# Patient Record
Sex: FEMALE | Race: WHITE | Employment: FULL TIME | URBAN - METROPOLITAN AREA
[De-identification: names, ages, dates, MRNs, and addresses within clinical notes are randomized per-mention and may not be internally consistent; named-entity substitution may affect disease eponyms.]

---

## 2017-01-26 RX ORDER — ACEBUTOLOL HYDROCHLORIDE 200 MG/1
CAPSULE ORAL
Qty: 60 CAPSULE | Refills: 5 | Status: SHIPPED | OUTPATIENT
Start: 2017-01-26 | End: 2017-08-17

## 2017-05-10 RX ORDER — ACEBUTOLOL HYDROCHLORIDE 200 MG/1
CAPSULE ORAL
Qty: 60 CAPSULE | Refills: 5 | Status: SHIPPED | OUTPATIENT
Start: 2017-05-10 | End: 2018-12-06

## 2017-05-26 ENCOUNTER — TELEPHONE (OUTPATIENT)
Dept: INTERNAL MEDICINE CLINIC | Facility: CLINIC | Age: 42
End: 2017-05-26

## 2017-05-26 RX ORDER — ALPRAZOLAM 0.25 MG/1
0.25 TABLET ORAL DAILY
Qty: 10 TABLET | Refills: 0 | OUTPATIENT
Start: 2017-05-26 | End: 2017-06-24

## 2017-05-26 NOTE — TELEPHONE ENCOUNTER
Pt states that she is under extreme stress and is having anxiety attacks. Pt states that this is her third one this week. Transferred call to [facility], [place], operated by [facility].

## 2017-05-26 NOTE — TELEPHONE ENCOUNTER
Patient called and informed. She stated the Xanax works better than the Clonazepam.   Will schedule an appointment if needed. Xanax called into the [facility] in [place]. Dr. Samira Kennedy please sign the order and close the encounter. Thanks.

## 2017-05-26 NOTE — TELEPHONE ENCOUNTER
Xanax– 0.25 mg–1 tablet p.o. daily as needed–10 tablets. Please have patient set up an appointment if multiple episodes or call counseling–behavioral health.

## 2017-05-26 NOTE — TELEPHONE ENCOUNTER
Actions Requested: advice/ expedited appt w/ PCP as no access. Refuses ED or other provider   Situation/Background   Problem: frequent panic attack, father's cancer is worsening and not receiving treatment,  Crying while sitting in car.   Daughter starting breathing (e.g., struggling for each breath, speaks in single words)  * Bluish lips, tongue, or face now  * Difficult to awaken or acting confused  (e.g., disoriented, slurred speech)  * Hysterical or combative behavior  * Sounds like a life-threatening em

## 2017-06-24 RX ORDER — ALPRAZOLAM 0.25 MG/1
TABLET ORAL
Qty: 10 TABLET | Refills: 0 | Status: SHIPPED | OUTPATIENT
Start: 2017-06-24 | End: 2017-08-17

## 2017-06-24 RX ORDER — CLONAZEPAM 0.5 MG/1
TABLET ORAL
Qty: 30 TABLET | Refills: 0 | Status: SHIPPED | OUTPATIENT
Start: 2017-06-24 | End: 2017-08-17

## 2017-06-24 RX ORDER — ALPRAZOLAM 0.25 MG/1
0.25 TABLET ORAL DAILY
Qty: 10 TABLET | Refills: 0 | OUTPATIENT
Start: 2017-06-24 | End: 2017-09-08

## 2017-06-24 RX ORDER — CLONAZEPAM 0.5 MG/1
TABLET ORAL
Qty: 30 TABLET | Refills: 3 | OUTPATIENT
Start: 2017-06-24 | End: 2018-01-19

## 2017-06-24 NOTE — TELEPHONE ENCOUNTER
Patient called in to request status of her RX nothing in epic  Patient states she is out of medication and the RX was sent in 3 days ago by the pharmacy     Clonazepam .5mg   Alprazolam 0.25 mg     if she can have 10 of Alprazolam   Patient states she is g

## 2017-07-12 RX ORDER — ATORVASTATIN CALCIUM 10 MG/1
TABLET, FILM COATED ORAL
Qty: 90 TABLET | Refills: 1 | Status: SHIPPED | OUTPATIENT
Start: 2017-07-12 | End: 2017-08-17

## 2017-08-17 ENCOUNTER — OFFICE VISIT (OUTPATIENT)
Dept: INTERNAL MEDICINE CLINIC | Facility: CLINIC | Age: 42
End: 2017-08-17

## 2017-08-17 VITALS
TEMPERATURE: 98 F | BODY MASS INDEX: 29.06 KG/M2 | HEART RATE: 94 BPM | HEIGHT: 63 IN | DIASTOLIC BLOOD PRESSURE: 79 MMHG | RESPIRATION RATE: 20 BRPM | WEIGHT: 164 LBS | OXYGEN SATURATION: 97 % | SYSTOLIC BLOOD PRESSURE: 116 MMHG

## 2017-08-17 DIAGNOSIS — F17.200 TOBACCO USE DISORDER: ICD-10-CM

## 2017-08-17 DIAGNOSIS — Z72.0 TOBACCO ABUSE DISORDER: ICD-10-CM

## 2017-08-17 DIAGNOSIS — E78.2 MIXED HYPERLIPIDEMIA: ICD-10-CM

## 2017-08-17 DIAGNOSIS — I47.1 SVT (SUPRAVENTRICULAR TACHYCARDIA) (HCC): ICD-10-CM

## 2017-08-17 DIAGNOSIS — Z01.419 ENCOUNTER FOR GYNECOLOGICAL EXAMINATION WITHOUT ABNORMAL FINDING: ICD-10-CM

## 2017-08-17 DIAGNOSIS — N92.6 IRREGULAR PERIODS/MENSTRUAL CYCLES: ICD-10-CM

## 2017-08-17 DIAGNOSIS — F32.A DEPRESSIVE DISORDER: ICD-10-CM

## 2017-08-17 DIAGNOSIS — Z00.00 ROUTINE GENERAL MEDICAL EXAMINATION AT A HEALTH CARE FACILITY: Primary | ICD-10-CM

## 2017-08-17 DIAGNOSIS — Z87.891 PERSONAL HISTORY OF TOBACCO USE, PRESENTING HAZARDS TO HEALTH: ICD-10-CM

## 2017-08-17 PROCEDURE — 99212 OFFICE O/P EST SF 10 MIN: CPT | Performed by: INTERNAL MEDICINE

## 2017-08-17 PROCEDURE — 99213 OFFICE O/P EST LOW 20 MIN: CPT | Performed by: INTERNAL MEDICINE

## 2017-08-17 PROCEDURE — 99396 PREV VISIT EST AGE 40-64: CPT | Performed by: INTERNAL MEDICINE

## 2017-08-17 RX ORDER — IBUPROFEN 800 MG/1
TABLET ORAL
Refills: 0 | COMMUNITY
Start: 2017-07-19 | End: 2019-10-22 | Stop reason: ALTCHOICE

## 2017-08-17 RX ORDER — BUPROPION HYDROCHLORIDE 150 MG/1
150 TABLET ORAL DAILY
Qty: 30 TABLET | Refills: 6 | Status: SHIPPED | OUTPATIENT
Start: 2017-08-17 | End: 2018-08-23

## 2017-08-17 RX ORDER — OMEGA-3-ACID ETHYL ESTERS 1 G/1
1 CAPSULE, LIQUID FILLED ORAL DAILY
COMMUNITY

## 2017-08-17 RX ORDER — ESCITALOPRAM OXALATE 5 MG/1
5 TABLET ORAL DAILY
Qty: 30 TABLET | Refills: 6 | Status: SHIPPED | OUTPATIENT
Start: 2017-08-17 | End: 2018-06-30

## 2017-08-17 NOTE — ASSESSMENT & PLAN NOTE
Depression has has been gradually worse. Multiple stressors at this time. Has discontinued her Lexapro as she did not need it at that time and had developed some sexual dysfunction too.   Advised to restart on the Lexapro–low doses at 5 mg 1 tablet once d

## 2017-08-17 NOTE — ASSESSMENT & PLAN NOTE
Discussed quitting smoking. Patient is under significant amount of stress and hence will start on treatment for depression and then start on Wellbutrin. Reassess in about 3 months.

## 2017-08-17 NOTE — ASSESSMENT & PLAN NOTE
Patient has been on Lipitor 10 mg 1 tablet once daily. She has tolerated it well and her recheck labs completed recently looked great. Triglycerides continue to be elevated however LDL cholesterol looks great.   Advised to cut back on sugars starches alco

## 2017-08-17 NOTE — ASSESSMENT & PLAN NOTE
Patient with multiple episodes of palpitations–2 over the past month. She has been taking acebutolol once daily which could explain the symptoms.   However she did take an over-the-counter energy booster which she has discontinued since and did experience

## 2017-08-17 NOTE — PROGRESS NOTES
HPI:    Patient ID: Reva Murphy is a 38year old female. Anxiety   This is a chronic problem. The current episode started more than 1 year ago. The problem occurs constantly.  The problem has been waxing and waning (has been on clonazepam with some i HENT: Negative. Eyes: Negative. Respiratory: Negative. Cardiovascular: Negative. Gastrointestinal: Negative. Endocrine: Negative. Genitourinary: Negative. Musculoskeletal: Negative. Skin: Negative.     Allergic/Immunologic: Jerri Wills discharge. Neck: Normal range of motion. Neck supple. No JVD present. No thyromegaly present. Cardiovascular: Normal rate, regular rhythm, normal heart sounds and intact distal pulses. No murmur heard.   Pulmonary/Chest: Effort normal and breath soun Coordination normal.   Skin: Skin is warm and dry. No rash noted. No erythema. Psychiatric: She has a normal mood and affect.  Her behavior is normal. Judgment and thought content normal. Cognition and memory are normal.   Nursing note and vitals reviewed completed–no palpable abnormalities, discharge from the nipples or axillary adenopathy. No cervical or inguinal lymphadenopathy. Hernial orifices intact. Pelvic exam completed–no cervical movement tenderness, adnexal palpable abnormalities.   No cystocel patient for more than 3 minutes and options for quitting.

## 2017-08-17 NOTE — PATIENT INSTRUCTIONS
Problem List Items Addressed This Visit        Unprioritized    Depressive disorder     Depression has has been gradually worse. Multiple stressors at this time.   Has discontinued her Lexapro as she did not need it at that time and had developed some sexu SVT (supraventricular tachycardia) ([facility] [address]     Patient with multiple episodes of palpitations–2 over the past month. She has been taking acebutolol once daily which could explain the symptoms.   However she did take an over-the-counter energy booster · Throw away all smoking materials, including matches, lighters, and ashtrays. · Review your list of triggers and your plan for coping with them. · Stay away from people or settings you link with smoking.   · Make a survival kit that includes gum, mints,

## 2017-08-17 NOTE — ASSESSMENT & PLAN NOTE
Normal exam.  Labs as ordered. Skin check -multiple nevi–advised to follow-up with Dr. Brenda Winn for a skin [id number] for an appointment  Breast exam completed–no palpable abnormalities, discharge from the nipples or axillary adenopathy.   No cervical

## 2017-08-19 LAB — HPV I/H RISK 1 DNA SPEC QL NAA+PROBE: NEGATIVE

## 2017-09-09 RX ORDER — ALPRAZOLAM 0.25 MG/1
TABLET ORAL
Qty: 30 TABLET | Refills: 2 | OUTPATIENT
Start: 2017-09-09 | End: 2018-03-15

## 2017-09-19 ENCOUNTER — HOSPITAL ENCOUNTER (OUTPATIENT)
Dept: ULTRASOUND IMAGING | Facility: HOSPITAL | Age: 42
Discharge: HOME | End: 2017-09-19
Attending: INTERNAL MEDICINE
Payer: COMMERCIAL

## 2017-09-19 DIAGNOSIS — N92.6 IRREGULAR PERIODS/MENSTRUAL CYCLES: ICD-10-CM

## 2017-09-19 PROCEDURE — 76856 US EXAM PELVIC COMPLETE: CPT | Performed by: INTERNAL MEDICINE

## 2017-09-19 PROCEDURE — 76830 TRANSVAGINAL US NON-OB: CPT | Performed by: INTERNAL MEDICINE

## 2017-10-19 ENCOUNTER — OFFICE VISIT (OUTPATIENT)
Dept: DERMATOLOGY CLINIC | Facility: CLINIC | Age: 42
End: 2017-10-19

## 2017-10-19 VITALS — BODY MASS INDEX: 29 KG/M2 | WEIGHT: 162 LBS

## 2017-10-19 DIAGNOSIS — D22.9 MULTIPLE NEVI: ICD-10-CM

## 2017-10-19 DIAGNOSIS — D23.70 BENIGN NEOPLASM OF SKIN OF LOWER LIMB, INCLUDING HIP, UNSPECIFIED LATERALITY: ICD-10-CM

## 2017-10-19 DIAGNOSIS — D23.5 BENIGN NEOPLASM OF SKIN OF TRUNK, EXCEPT SCROTUM: ICD-10-CM

## 2017-10-19 DIAGNOSIS — D23.30 BENIGN NEOPLASM OF SKIN OF FACE: ICD-10-CM

## 2017-10-19 DIAGNOSIS — L30.9 DERMATITIS: Primary | ICD-10-CM

## 2017-10-19 DIAGNOSIS — D23.60 BENIGN NEOPLASM OF SKIN OF UPPER LIMB, INCLUDING SHOULDER, UNSPECIFIED LATERALITY: ICD-10-CM

## 2017-10-19 DIAGNOSIS — D23.4 BENIGN NEOPLASM OF SCALP AND SKIN OF NECK: ICD-10-CM

## 2017-10-19 PROCEDURE — 99202 OFFICE O/P NEW SF 15 MIN: CPT | Performed by: DERMATOLOGY

## 2017-10-19 PROCEDURE — 99212 OFFICE O/P EST SF 10 MIN: CPT | Performed by: DERMATOLOGY

## 2017-10-19 RX ORDER — CLOBETASOL PROPIONATE 0.5 MG/G
1 CREAM TOPICAL 2 TIMES DAILY
Qty: 45 G | Refills: 1 | Status: SHIPPED | OUTPATIENT
Start: 2017-10-19 | End: 2018-10-19

## 2017-10-19 RX ORDER — AMMONIUM LACTATE 12 G/100G
LOTION TOPICAL
Qty: 500 G | Refills: 3 | Status: SHIPPED | OUTPATIENT
Start: 2017-10-19

## 2017-10-29 NOTE — PROGRESS NOTES
Ariel Andino is a 38year old female.   HPI:     CC:  Patient presents with:  Derm Problem: scaly round patches on truck ,back,and rt leg ,denies itch ,bleeding or pain x 2 weeks   Moles: lower extrematies patient concerned ,hx of tanning ,denies itch ,p ammonium lactate (LAC-HYDRIN) 12 % External Lotion Use bid as directed to legs Disp: 500 g Rfl: 3   ALPRAZOLAM 0.25 MG Oral Tab TAKE 1 TABLET BY MOUTH DAILY AS NEEDED Disp: 30 tablet Rfl: 2   COENZYME Q-10 OR Take by mouth.  Disp:  Rfl:    Omega-3-acid Et History  None on file     Social History Main Topics   Smoking status: Current Every Day Smoker  1.00 Packs/day  For 18.00 Years     Types: Cigarettes    Smokeless tobacco: Never Used    Alcohol use Yes  0.0 oz/week     Comment: 1 drink every 2-3 months alert oriented in no acute distress. Exam total-body performed, including scalp, head, neck, face,nails, hair, external eyes, including conjunctival mucosa, eyelids, lips external ears, back, chest,/ breasts, axillae,  abdomen, arms, legs, palms.      Mult scaling seborrheic keratoses, DS AP-like a over the legs a nd some areas at AmLactin for xerotic, scaling changes. Numerous benign-appearing nevi no suspicious lesions reassurance. Please refer to map for specific lesions. See additional diagnoses.   P

## 2017-11-02 RX ORDER — ACEBUTOLOL HYDROCHLORIDE 200 MG/1
CAPSULE ORAL
Qty: 180 CAPSULE | Refills: 1 | Status: SHIPPED | OUTPATIENT
Start: 2017-11-02 | End: 2019-10-17

## 2018-01-20 RX ORDER — CLONAZEPAM 0.5 MG/1
TABLET ORAL
Qty: 30 TABLET | Refills: 3 | OUTPATIENT
Start: 2018-01-20 | End: 2018-08-23

## 2018-01-22 RX ORDER — ERGOCALCIFEROL 1.25 MG/1
CAPSULE ORAL
Qty: 12 CAPSULE | Refills: 0 | OUTPATIENT
Start: 2018-01-22

## 2018-01-26 ENCOUNTER — TELEPHONE (OUTPATIENT)
Dept: INTERNAL MEDICINE CLINIC | Facility: CLINIC | Age: 43
End: 2018-01-26

## 2018-01-26 NOTE — TELEPHONE ENCOUNTER
Pharmacy requesting refill for Current Outpatient Prescriptions:  CLONAZEPAM 0.5 MG Oral Tab TAKE 1 TABLET BY MOUTH AS NEEDED FOR ANXIETY Disp: 30 tablet Rfl: 3   Sent over refill 2 times.

## 2018-03-16 RX ORDER — ALPRAZOLAM 0.25 MG/1
TABLET ORAL
Qty: 30 TABLET | Refills: 1 | OUTPATIENT
Start: 2018-03-16 | End: 2019-03-16

## 2018-05-10 RX ORDER — ATORVASTATIN CALCIUM 10 MG/1
TABLET, FILM COATED ORAL
Qty: 90 TABLET | Refills: 1 | Status: SHIPPED | OUTPATIENT
Start: 2018-05-10 | End: 2018-12-06

## 2018-05-10 RX ORDER — ACEBUTOLOL HYDROCHLORIDE 200 MG/1
CAPSULE ORAL
Qty: 180 CAPSULE | Refills: 1 | Status: SHIPPED | OUTPATIENT
Start: 2018-05-10 | End: 2018-12-06

## 2018-07-01 NOTE — TELEPHONE ENCOUNTER
Refill Protocol Appointment Criteria  · Appointment scheduled in the past 6 months or in the next 3 months  Recent Outpatient Visits            8 months ago Dermatitis    [facility] Dermat[facility] Ramiro Ovalle MD    Office Visit    10 months

## 2018-07-02 RX ORDER — ESCITALOPRAM OXALATE 5 MG/1
TABLET ORAL
Qty: 30 TABLET | Refills: 5 | Status: SHIPPED | OUTPATIENT
Start: 2018-07-02 | End: 2018-12-06

## 2018-08-25 NOTE — TELEPHONE ENCOUNTER
LOV: 8-17-17 Last Rx: 8-17-17    Please advise in regards to refill request. Thank You    Refill Protocol Appointment Criteria  · Appointment scheduled in the past 6 months or in the next 3 months  Recent Outpatient Visits            10 months ago Dermatit

## 2018-08-27 RX ORDER — BUPROPION HYDROCHLORIDE 150 MG/1
TABLET ORAL
Qty: 30 TABLET | Refills: 5 | Status: SHIPPED | OUTPATIENT
Start: 2018-08-27 | End: 2019-10-22

## 2018-08-27 RX ORDER — CLONAZEPAM 0.5 MG/1
TABLET ORAL
Qty: 30 TABLET | Refills: 5 | Status: SHIPPED
Start: 2018-08-27 | End: 2019-04-06

## 2018-08-28 ENCOUNTER — TELEPHONE (OUTPATIENT)
Dept: INTERNAL MEDICINE CLINIC | Facility: CLINIC | Age: 43
End: 2018-08-28

## 2018-08-28 NOTE — TELEPHONE ENCOUNTER
[address], [phone], [phone]   Medication Detail    Disp Refills Start End    BUPROPION HCL ER, XL, 150 MG Oral Tablet 24 Hr 30 tablet 5 8/27/2018     Sig:

## 2018-08-28 NOTE — TELEPHONE ENCOUNTER
Pharmacy is requesting a refill for BUPROPION XL 150MG TABLETS (24 H) for a quantity of 30. Please advise.

## 2018-12-06 ENCOUNTER — TELEPHONE (OUTPATIENT)
Dept: INTERNAL MEDICINE CLINIC | Facility: CLINIC | Age: 43
End: 2018-12-06

## 2018-12-06 ENCOUNTER — OFFICE VISIT (OUTPATIENT)
Dept: INTERNAL MEDICINE CLINIC | Facility: CLINIC | Age: 43
End: 2018-12-06
Payer: COMMERCIAL

## 2018-12-06 VITALS
WEIGHT: 164.5 LBS | OXYGEN SATURATION: 97 % | SYSTOLIC BLOOD PRESSURE: 134 MMHG | HEIGHT: 62.75 IN | BODY MASS INDEX: 29.52 KG/M2 | TEMPERATURE: 98 F | DIASTOLIC BLOOD PRESSURE: 86 MMHG | RESPIRATION RATE: 22 BRPM | HEART RATE: 116 BPM

## 2018-12-06 DIAGNOSIS — I47.1 SVT (SUPRAVENTRICULAR TACHYCARDIA) (HCC): ICD-10-CM

## 2018-12-06 DIAGNOSIS — E55.9 VITAMIN D DEFICIENCY: ICD-10-CM

## 2018-12-06 DIAGNOSIS — Z12.31 VISIT FOR SCREENING MAMMOGRAM: Primary | ICD-10-CM

## 2018-12-06 DIAGNOSIS — R07.2 PRECORDIAL PAIN: ICD-10-CM

## 2018-12-06 DIAGNOSIS — Z00.00 ROUTINE GENERAL MEDICAL EXAMINATION AT A HEALTH CARE FACILITY: ICD-10-CM

## 2018-12-06 DIAGNOSIS — Z72.0 TOBACCO ABUSE DISORDER: ICD-10-CM

## 2018-12-06 DIAGNOSIS — J41.1 BRONCHITIS, MUCOPURULENT RECURRENT (HCC): ICD-10-CM

## 2018-12-06 PROCEDURE — 99396 PREV VISIT EST AGE 40-64: CPT | Performed by: INTERNAL MEDICINE

## 2018-12-06 RX ORDER — LEVOFLOXACIN 500 MG/1
500 TABLET, FILM COATED ORAL DAILY
Qty: 10 TABLET | Refills: 0 | Status: SHIPPED | OUTPATIENT
Start: 2018-12-06 | End: 2018-12-16

## 2018-12-06 RX ORDER — FLUTICASONE PROPIONATE AND SALMETEROL 100; 50 UG/1; UG/1
1 POWDER RESPIRATORY (INHALATION) 2 TIMES DAILY
Qty: 3 EACH | Refills: 3 | Status: SHIPPED | OUTPATIENT
Start: 2018-12-06 | End: 2019-10-22

## 2018-12-06 RX ORDER — ATORVASTATIN CALCIUM 10 MG/1
TABLET, FILM COATED ORAL
Qty: 90 TABLET | Refills: 1 | Status: SHIPPED | OUTPATIENT
Start: 2018-12-06 | End: 2019-10-22 | Stop reason: ALTCHOICE

## 2018-12-06 RX ORDER — ESCITALOPRAM OXALATE 5 MG/1
TABLET ORAL
Qty: 90 TABLET | Refills: 1 | Status: SHIPPED | OUTPATIENT
Start: 2018-12-06 | End: 2020-02-25

## 2018-12-06 NOTE — TELEPHONE ENCOUNTER
Pt had schedule appt for tomorrow but pt can't come in so pt is wondering if Dr can fit her in today due to pt able to come into day and not tomorrow.

## 2018-12-07 RX ORDER — ATORVASTATIN CALCIUM 10 MG/1
TABLET, FILM COATED ORAL
Qty: 90 TABLET | Refills: 1 | Status: SHIPPED | OUTPATIENT
Start: 2018-12-07 | End: 2019-06-14

## 2018-12-07 NOTE — ASSESSMENT & PLAN NOTE
Recurrent episodes of bronchitis with expectoration. Patient is a smoker. Advised to start on Advair 1 puff 2 times daily. Levaquin 500 mg 1 tablet once daily for the next 10 days. Call if symptoms do not improve. Drink plenty of warm fluids.   Continu

## 2018-12-07 NOTE — ASSESSMENT & PLAN NOTE
History of SVT and palpitations. She has been on acebutolol which has controlled her symptoms. She is monitored per Dr. James Villar.

## 2018-12-07 NOTE — ASSESSMENT & PLAN NOTE
Normal exam.  Labs as ordered. Skin check -multiple nevi–advised to follow-up with Dr. Paulette Emanuel for a skin [id number] for an appointment  Breast exam completed–no palpable abnormalities, discharge from the nipples or axillary adenopathy.   No cervical

## 2018-12-07 NOTE — PATIENT INSTRUCTIONS
Problem List Items Addressed This Visit        Unprioritized    Bronchitis, mucopurulent recurrent ([address]     Recurrent episodes of bronchitis with expectoration. Patient is a smoker. Advised to start on Advair 1 puff 2 times daily.   Levaquin 500 mg 1 tab disorder     Patient continues to smoke. She is unable to tolerate Chantix. She has been on Wellbutrin.   Advised to consider Nicotrol inhalers in place of smoking especially due to added risk from family history of lung cancer           Other Visit Diagn

## 2018-12-07 NOTE — TELEPHONE ENCOUNTER
Cholesterol Medications  Protocol Criteria:  · Appointment scheduled in the past 12 months or in the next 3 months  · ALT & LDL on file in the past 12 months  · ALT result < 80  · LDL result <130   Recent Outpatient Visits            1 year ago Dermatitis

## 2018-12-07 NOTE — ASSESSMENT & PLAN NOTE
Patient continues to smoke. She is unable to tolerate Chantix. She has been on Wellbutrin.   Advised to consider Nicotrol inhalers in place of smoking especially due to added risk from family history of lung cancer

## 2018-12-07 NOTE — ASSESSMENT & PLAN NOTE
Intermittent episodes of chest pain and palpitations. History of SVT which has been stable. Treadmill stress test has been ordered. Consider a CT calcium scoring study to evaluate for other abnormalities.

## 2019-01-28 LAB
ABSOLUTE BASOPHILS: 53 CELLS/UL (ref 0–200)
ABSOLUTE EOSINOPHILS: 137 CELLS/UL (ref 15–500)
ABSOLUTE LYMPHOCYTES: 3360 CELLS/UL (ref 850–3900)
ABSOLUTE MONOCYTES: 536 CELLS/UL (ref 200–950)
ABSOLUTE NEUTROPHILS: 6416 CELLS/UL (ref 1500–7800)
ALBUMIN/GLOBULIN RATIO: 1.9 (CALC) (ref 1–2.5)
ALBUMIN: 4.3 G/DL (ref 3.6–5.1)
ALKALINE PHOSPHATASE: 69 U/L (ref 33–115)
ALT: 35 U/L (ref 6–29)
APPEARANCE: CLEAR
AST: 34 U/L (ref 10–35)
BASOPHILS: 0.5 %
BILIRUBIN, TOTAL: 0.4 MG/DL (ref 0.2–1.2)
BILIRUBIN: NEGATIVE
BUN: 12 MG/DL (ref 7–25)
CALCIUM: 9.6 MG/DL (ref 8.6–10.2)
CARBON DIOXIDE: 27 MMOL/L (ref 20–32)
CHLORIDE: 107 MMOL/L (ref 98–110)
CHOL/HDLC RATIO: 3.7 (CALC)
CHOLESTEROL, TOTAL: 167 MG/DL
COLOR: YELLOW
CREATININE: 0.81 MG/DL (ref 0.5–1.1)
EGFR IF AFRICN AM: 102 ML/MIN/1.73M2
EGFR IF NONAFRICN AM: 88 ML/MIN/1.73M2
EOSINOPHILS: 1.3 %
GLOBULIN: 2.3 G/DL (CALC) (ref 1.9–3.7)
GLUCOSE: 99 MG/DL (ref 65–99)
GLUCOSE: NEGATIVE
HDL CHOLESTEROL: 45 MG/DL
HEMATOCRIT: 40.9 % (ref 35–45)
HEMOGLOBIN: 13.6 G/DL (ref 11.7–15.5)
KETONES: NEGATIVE
LDL-CHOLESTEROL: 97 MG/DL (CALC)
LEUKOCYTE ESTERASE: NEGATIVE
LYMPHOCYTES: 32 %
MCH: 29.1 PG (ref 27–33)
MCHC: 33.3 G/DL (ref 32–36)
MCV: 87.4 FL (ref 80–100)
MONOCYTES: 5.1 %
MPV: 9.9 FL (ref 7.5–12.5)
NEUTROPHILS: 61.1 %
NITRITE: NEGATIVE
NON-HDL CHOLESTEROL: 122 MG/DL (CALC)
OCCULT BLOOD: NEGATIVE
PH: 5.5 (ref 5–8)
PLATELET COUNT: 327 THOUSAND/UL (ref 140–400)
POTASSIUM: 4.3 MMOL/L (ref 3.5–5.3)
PROTEIN, TOTAL: 6.6 G/DL (ref 6.1–8.1)
PROTEIN: NEGATIVE
RDW: 13 % (ref 11–15)
RED BLOOD CELL COUNT: 4.68 MILLION/UL (ref 3.8–5.1)
SODIUM: 140 MMOL/L (ref 135–146)
SPECIFIC GRAVITY: 1.01 (ref 1–1.03)
TRIGLYCERIDES: 145 MG/DL
TSH: 1.09 MIU/L
VITAMIN B12: 368 PG/ML (ref 200–1100)
VITAMIN D, 25-OH, TOTAL: 15 NG/ML (ref 30–100)
WHITE BLOOD CELL COUNT: 10.5 THOUSAND/UL (ref 3.8–10.8)

## 2019-02-01 ENCOUNTER — TELEPHONE (OUTPATIENT)
Dept: OTHER | Age: 44
End: 2019-02-01

## 2019-02-01 NOTE — TELEPHONE ENCOUNTER
FYI- CT scoring test not available month of February according to pt . She was told \" the special rate will be available the month of March however, only to those who qualify based on a health screening form.   \"  Pt states she did fill this form out yest

## 2019-02-01 NOTE — TELEPHONE ENCOUNTER
Spoke with patient (identified name and ), results reviewed and agrees with plan. Notes recorded by Tim Chaidez MD on 2019 at 8:45 PM CST  The blood counts look normal.no anemia.   The sugars ,calcium and electrolytes are normal.  The kidney

## 2019-03-18 RX ORDER — ALPRAZOLAM 0.25 MG/1
TABLET ORAL
Qty: 30 TABLET | Refills: 3 | OUTPATIENT
Start: 2019-03-18 | End: 2019-10-07

## 2019-03-18 NOTE — TELEPHONE ENCOUNTER
Controlled medication pending for review. If approved needs to be called in or faxed by on-site staff.     Last Rx: 3/16/18  LOV: 12/6/18

## 2019-04-08 NOTE — TELEPHONE ENCOUNTER
The patient stated she takes both the Alprazolam and Clonazepam.   She uses the Clonazepam only when needed.     Last refilled the Clonazepam on 8/27/18  Last office visit on 12/6/18

## 2019-04-10 RX ORDER — CLONAZEPAM 0.5 MG/1
TABLET ORAL
Qty: 30 TABLET | Refills: 5 | OUTPATIENT
Start: 2019-04-10 | End: 2020-02-25

## 2019-06-14 RX ORDER — ATORVASTATIN CALCIUM 10 MG/1
TABLET, FILM COATED ORAL
Qty: 90 TABLET | Refills: 1 | Status: SHIPPED | OUTPATIENT
Start: 2019-06-14 | End: 2020-07-20

## 2019-06-14 NOTE — TELEPHONE ENCOUNTER
Refill passed per [facility]], [facility] protocol.   Cholesterol Medications  Protocol Criteria:  · Appointment scheduled in the past 12 months or in the next 3 months  · ALT & LDL on file in the past 12 months  · ALT result < 80  · LDL result <130   Recent Outpat

## 2019-08-16 ENCOUNTER — HOSPITAL ENCOUNTER (OUTPATIENT)
Dept: MAMMOGRAPHY | Facility: HOSPITAL | Age: 44
Discharge: HOME | End: 2019-08-16
Attending: INTERNAL MEDICINE
Payer: COMMERCIAL

## 2019-08-16 DIAGNOSIS — Z12.31 VISIT FOR SCREENING MAMMOGRAM: ICD-10-CM

## 2019-08-16 PROCEDURE — 77067 SCR MAMMO BI INCL CAD: CPT | Performed by: INTERNAL MEDICINE

## 2019-08-16 PROCEDURE — 77063 BREAST TOMOSYNTHESIS BI: CPT | Performed by: INTERNAL MEDICINE

## 2019-08-20 ENCOUNTER — HOSPITAL ENCOUNTER (OUTPATIENT)
Dept: CV DIAGNOSTICS | Facility: HOSPITAL | Age: 44
Discharge: HOME | End: 2019-08-20
Attending: INTERNAL MEDICINE
Payer: COMMERCIAL

## 2019-08-20 DIAGNOSIS — I47.1 SVT (SUPRAVENTRICULAR TACHYCARDIA) (HCC): ICD-10-CM

## 2019-08-20 DIAGNOSIS — R07.2 PRECORDIAL PAIN: ICD-10-CM

## 2019-08-20 PROCEDURE — 93017 CV STRESS TEST TRACING ONLY: CPT | Performed by: INTERNAL MEDICINE

## 2019-08-20 PROCEDURE — 93018 CV STRESS TEST I&R ONLY: CPT | Performed by: INTERNAL MEDICINE

## 2019-08-20 PROCEDURE — 93016 CV STRESS TEST SUPVJ ONLY: CPT | Performed by: INTERNAL MEDICINE

## 2019-08-21 ENCOUNTER — HOSPITAL ENCOUNTER (OUTPATIENT)
Dept: MAMMOGRAPHY | Facility: HOSPITAL | Age: 44
Discharge: HOME | End: 2019-08-21
Attending: INTERNAL MEDICINE
Payer: COMMERCIAL

## 2019-08-21 ENCOUNTER — HOSPITAL ENCOUNTER (OUTPATIENT)
Dept: ULTRASOUND IMAGING | Facility: HOSPITAL | Age: 44
Discharge: HOME | End: 2019-08-21
Attending: INTERNAL MEDICINE
Payer: COMMERCIAL

## 2019-08-21 DIAGNOSIS — R92.8 ABNORMAL MAMMOGRAM: ICD-10-CM

## 2019-08-21 PROCEDURE — 76642 ULTRASOUND BREAST LIMITED: CPT | Performed by: INTERNAL MEDICINE

## 2019-08-21 PROCEDURE — 77065 DX MAMMO INCL CAD UNI: CPT | Performed by: INTERNAL MEDICINE

## 2019-08-21 PROCEDURE — 77061 BREAST TOMOSYNTHESIS UNI: CPT | Performed by: INTERNAL MEDICINE

## 2019-10-07 RX ORDER — ALPRAZOLAM 0.25 MG/1
TABLET ORAL
Qty: 30 TABLET | Refills: 0 | OUTPATIENT
Start: 2019-10-07 | End: 2021-12-14

## 2019-10-08 NOTE — TELEPHONE ENCOUNTER
Controlled medication pending for review. Please change to phone in, fax, or print script if not being sent electronically. Requested Prescriptions     Pending Prescriptions Disp Refills   • ALPRAZOLAM 0.25 MG Oral Tab [Pharmacy Med Name: ALPRAZOLAM 0.

## 2019-10-12 ENCOUNTER — TELEPHONE (OUTPATIENT)
Dept: INTERNAL MEDICINE CLINIC | Facility: CLINIC | Age: 44
End: 2019-10-12

## 2019-10-12 NOTE — TELEPHONE ENCOUNTER
I see pt. Has a PX appmt for 12/13/19. Couild you please fit her in for missed cycle before.   Thanks

## 2019-10-12 NOTE — TELEPHONE ENCOUNTER
Patient is requesting a sooner appointment to see Dr. Jewel prince missed two cycles.      Please advise

## 2019-10-18 RX ORDER — ACEBUTOLOL HYDROCHLORIDE 200 MG/1
200 CAPSULE ORAL 2 TIMES DAILY
Qty: 180 CAPSULE | Refills: 1 | Status: SHIPPED | OUTPATIENT
Start: 2019-10-18 | End: 2021-01-24

## 2019-10-18 NOTE — TELEPHONE ENCOUNTER
Hypertensive Medications  Protocol Criteria:  · Appointment scheduled in the past 6 months or in the next 3 months  · BMP or CMP in the past 12 months  · Creatinine result < 2  Recent Outpatient Visits            10 months ago Visit for screening mammogram

## 2019-10-22 ENCOUNTER — OFFICE VISIT (OUTPATIENT)
Dept: INTERNAL MEDICINE CLINIC | Facility: CLINIC | Age: 44
End: 2019-10-22
Payer: COMMERCIAL

## 2019-10-22 VITALS
DIASTOLIC BLOOD PRESSURE: 84 MMHG | BODY MASS INDEX: 29.79 KG/M2 | HEART RATE: 101 BPM | HEIGHT: 62.75 IN | SYSTOLIC BLOOD PRESSURE: 132 MMHG | WEIGHT: 166 LBS

## 2019-10-22 DIAGNOSIS — N91.2 AMENORRHEA: ICD-10-CM

## 2019-10-22 DIAGNOSIS — M25.571 ACUTE RIGHT ANKLE PAIN: ICD-10-CM

## 2019-10-22 DIAGNOSIS — R14.0 BLOATING: Primary | ICD-10-CM

## 2019-10-22 DIAGNOSIS — E55.9 VITAMIN D DEFICIENCY: ICD-10-CM

## 2019-10-22 DIAGNOSIS — Z00.00 ROUTINE PHYSICAL EXAMINATION: ICD-10-CM

## 2019-10-22 PROCEDURE — 99214 OFFICE O/P EST MOD 30 MIN: CPT | Performed by: INTERNAL MEDICINE

## 2019-10-22 RX ORDER — ERGOCALCIFEROL 1.25 MG/1
CAPSULE ORAL
Refills: 1 | COMMUNITY
Start: 2019-05-07 | End: 2021-03-17

## 2019-10-22 NOTE — PROGRESS NOTES
HPI:    Patient ID: Ariel Andino is a 53year old female. Stress test looks normal.    Bloating   This is a recurrent problem. The current episode started more than 1 month ago. The problem occurs constantly.  The problem has been gradually worsening PO 1 TIME A WK  1   • Acebutolol HCl 200 MG Oral Cap Take 1 capsule (200 mg total) by mouth 2 (two) times daily.  180 capsule 1   • ALPRAZOLAM 0.25 MG Oral Tab TAKE 1 TABLET BY MOUTH DAILY AS NEEDED 30 tablet 0   • atorvastatin 10 MG Oral Tab TAKE 1 TABLET( found.     Lymphadenopathy:     She has no cervical adenopathy. Neurological: She is alert and oriented to person, place, and time. She has normal reflexes. No cranial nerve deficit. She exhibits normal muscle tone.  Coordination normal.   Skin: No rash n Routine physical examination        Relevant Orders    FREE T3 (TRIIODOTHYRONINE)    FREE T4 (FREE THYROXINE)    ASSAY, THYROID STIM HORMONE    VITAMIN B12    URINALYSIS, ROUTINE    CBC WITH DIFFERENTIAL WITH PLATELET    COMP METABOLIC PANEL (14)    LIPID

## 2019-10-23 NOTE — ASSESSMENT & PLAN NOTE
History of ankle sprain around 4 July. She did have a swollen ankle for a short while. Currently able to walk without support of braces.   Has noticed pain in the lateral aspect of the ankle around the lateral malleolus as well as the lower part of the Ac

## 2019-10-23 NOTE — PATIENT INSTRUCTIONS
Problem List Items Addressed This Visit        Unprioritized    Acute right ankle pain     History of ankle sprain around 4 July. She did have a swollen ankle for a short while. Currently able to walk without support of braces.   Has noticed pain in the l

## 2019-10-23 NOTE — ASSESSMENT & PLAN NOTE
Absence of menstrual cycles for the past 2 months. Regular cycles prior to that. Abdominal bloating, breast tenderness and pain. Patient has had a home pregnancy test which was negative. Recheck labs to rule out pregnancy.   Most likely perimenopausal t

## 2020-02-25 RX ORDER — CLONAZEPAM 0.5 MG/1
TABLET ORAL
Qty: 30 TABLET | Refills: 1 | Status: SHIPPED | OUTPATIENT
Start: 2020-02-25 | End: 2020-06-08

## 2020-02-25 RX ORDER — ESCITALOPRAM OXALATE 5 MG/1
TABLET ORAL
Qty: 90 TABLET | Refills: 1 | Status: SHIPPED | OUTPATIENT
Start: 2020-02-25 | End: 2021-01-28

## 2020-02-25 NOTE — TELEPHONE ENCOUNTER
Current Outpatient Medications:   •  CLONAZEPAM 0.5 MG Oral Tab, TAKE 1 TABLET BY MOUTH EVERY DAY AS NEEDED FOR ANXIETY, Disp: 30 tablet, Rfl: 5

## 2020-02-25 NOTE — TELEPHONE ENCOUNTER
Current Outpatient Medications   Medication Sig Dispense Refill   • escitalopram 5 MG Oral Tab TAKE 1 TABLET(5 MG) BY MOUTH DAILY 90 tablet 1

## 2020-02-26 NOTE — TELEPHONE ENCOUNTER
Refill passed per [facility], [facility] protocol.   Refill Protocol Appointment Criteria  · Appointment scheduled in the past 6 months or in the next 3 months  Recent Outpatient Visits            4 months ago Bloating    [facility], [facility], [address] [facility]

## 2020-02-26 NOTE — TELEPHONE ENCOUNTER
Controlled medication pending for review. Please change to phone in, fax, or print script if not being sent electronically.     Last Rx: 4-10-19 # 30 + 5  LOV: 10-22-19      Requested Prescriptions     Pending Prescriptions Disp Refills   • clonazePAM 0.5

## 2020-06-08 RX ORDER — CLONAZEPAM 0.5 MG/1
TABLET ORAL
Qty: 30 TABLET | Refills: 3 | Status: SHIPPED | OUTPATIENT
Start: 2020-06-08 | End: 2021-01-03

## 2020-07-20 ENCOUNTER — TELEPHONE (OUTPATIENT)
Dept: INTERNAL MEDICINE CLINIC | Facility: CLINIC | Age: 45
End: 2020-07-20

## 2020-07-20 RX ORDER — ATORVASTATIN CALCIUM 10 MG/1
TABLET, FILM COATED ORAL
Qty: 90 TABLET | Refills: 1 | Status: SHIPPED | OUTPATIENT
Start: 2020-07-20 | End: 2021-04-27

## 2021-01-03 RX ORDER — CLONAZEPAM 0.5 MG/1
TABLET ORAL
Qty: 30 TABLET | Refills: 0 | Status: SHIPPED | OUTPATIENT
Start: 2021-01-03 | End: 2021-02-11

## 2021-01-19 ENCOUNTER — MED REC SCAN ONLY (OUTPATIENT)
Dept: INTERNAL MEDICINE CLINIC | Facility: CLINIC | Age: 46
End: 2021-01-19

## 2021-01-22 ENCOUNTER — TELEPHONE (OUTPATIENT)
Dept: INTERNAL MEDICINE CLINIC | Facility: CLINIC | Age: 46
End: 2021-01-22

## 2021-01-22 NOTE — TELEPHONE ENCOUNTER
Current Outpatient Medications   Medication Sig Dispense Refill   • Acebutolol HCl 200 MG Oral Cap Take 1 capsule (200 mg total) by mouth 2 (two) times daily.  180 capsule 1

## 2021-01-23 NOTE — TELEPHONE ENCOUNTER
It has been a year since pt's last visit. Pt not using [facility] Call center, please assist with an appointment.

## 2021-01-24 RX ORDER — ACEBUTOLOL HYDROCHLORIDE 200 MG/1
200 CAPSULE ORAL 2 TIMES DAILY
Qty: 180 CAPSULE | Refills: 1 | Status: SHIPPED | OUTPATIENT
Start: 2021-01-24

## 2021-01-25 NOTE — TELEPHONE ENCOUNTER
1st attempt/left message for pt to call back.  When the patient returns the call please assist in making an appointment per the below request.

## 2021-01-28 RX ORDER — ESCITALOPRAM OXALATE 5 MG/1
TABLET ORAL
Qty: 90 TABLET | Refills: 1 | Status: SHIPPED | OUTPATIENT
Start: 2021-01-28 | End: 2021-07-27

## 2021-02-03 ENCOUNTER — OFFICE VISIT (OUTPATIENT)
Dept: DERMATOLOGY CLINIC | Facility: CLINIC | Age: 46
End: 2021-02-03
Payer: COMMERCIAL

## 2021-02-03 DIAGNOSIS — L91.8 SKIN TAGS, MULTIPLE ACQUIRED: Primary | ICD-10-CM

## 2021-02-03 PROCEDURE — 11200 RMVL SKIN TAGS UP TO&INC 15: CPT | Performed by: PHYSICIAN ASSISTANT

## 2021-02-03 PROCEDURE — 99203 OFFICE O/P NEW LOW 30 MIN: CPT | Performed by: PHYSICIAN ASSISTANT

## 2021-02-03 RX ORDER — PREDNISONE 10 MG/1
TABLET ORAL
COMMUNITY
Start: 2020-09-25 | End: 2021-03-09 | Stop reason: ALTCHOICE

## 2021-02-03 RX ORDER — PROMETHAZINE HYDROCHLORIDE 6.25 MG/5ML
SYRUP ORAL
COMMUNITY
Start: 2020-09-25

## 2021-02-03 NOTE — PROGRESS NOTES
HPI:    Patient ID: Janet Parent is a 50year old female. Patient with lesions on the neck on the right side. States they are painful. The necklaces and clothing she wears gets caught on them. She has not had them removed before.  She also has one loc 1. Skin tags, multiple acquired  -After discussion with patient, advised the following:  -Advised removal of skin tags  -Use cryotherapy for smaller lesions.   -Patient was educated on lesions and therapy.   -The patient was prepped to have larger skin tag

## 2021-02-11 RX ORDER — CLONAZEPAM 0.5 MG/1
TABLET ORAL
Qty: 30 TABLET | Refills: 0 | Status: SHIPPED | OUTPATIENT
Start: 2021-02-11 | End: 2021-03-09

## 2021-03-09 ENCOUNTER — OFFICE VISIT (OUTPATIENT)
Dept: INTERNAL MEDICINE CLINIC | Facility: CLINIC | Age: 46
End: 2021-03-09
Payer: COMMERCIAL

## 2021-03-09 VITALS
RESPIRATION RATE: 20 BRPM | SYSTOLIC BLOOD PRESSURE: 108 MMHG | HEART RATE: 88 BPM | HEIGHT: 57 IN | DIASTOLIC BLOOD PRESSURE: 76 MMHG | WEIGHT: 157.19 LBS | TEMPERATURE: 97 F | BODY MASS INDEX: 33.91 KG/M2

## 2021-03-09 DIAGNOSIS — E55.9 VITAMIN D DEFICIENCY: ICD-10-CM

## 2021-03-09 DIAGNOSIS — F17.210 CIGARETTE SMOKER: ICD-10-CM

## 2021-03-09 DIAGNOSIS — E78.2 MIXED HYPERLIPIDEMIA: ICD-10-CM

## 2021-03-09 DIAGNOSIS — F32.A DEPRESSIVE DISORDER: ICD-10-CM

## 2021-03-09 DIAGNOSIS — Z00.00 ROUTINE GENERAL MEDICAL EXAMINATION AT A HEALTH CARE FACILITY: ICD-10-CM

## 2021-03-09 DIAGNOSIS — I47.1 SVT (SUPRAVENTRICULAR TACHYCARDIA) (HCC): ICD-10-CM

## 2021-03-09 DIAGNOSIS — Z72.0 TOBACCO ABUSE DISORDER: ICD-10-CM

## 2021-03-09 DIAGNOSIS — Z01.419 PAP SMEAR, AS PART OF ROUTINE GYNECOLOGICAL EXAMINATION: ICD-10-CM

## 2021-03-09 DIAGNOSIS — Z01.419 ENCOUNTER FOR ANNUAL ROUTINE GYNECOLOGICAL EXAMINATION: Primary | ICD-10-CM

## 2021-03-09 PROCEDURE — 3008F BODY MASS INDEX DOCD: CPT | Performed by: INTERNAL MEDICINE

## 2021-03-09 PROCEDURE — 99406 BEHAV CHNG SMOKING 3-10 MIN: CPT | Performed by: INTERNAL MEDICINE

## 2021-03-09 PROCEDURE — 99396 PREV VISIT EST AGE 40-64: CPT | Performed by: INTERNAL MEDICINE

## 2021-03-09 PROCEDURE — 3078F DIAST BP <80 MM HG: CPT | Performed by: INTERNAL MEDICINE

## 2021-03-09 PROCEDURE — 3074F SYST BP LT 130 MM HG: CPT | Performed by: INTERNAL MEDICINE

## 2021-03-09 RX ORDER — CLONAZEPAM 0.5 MG/1
0.5 TABLET ORAL
Qty: 30 TABLET | Refills: 5 | Status: SHIPPED | OUTPATIENT
Start: 2021-03-09 | End: 2021-09-27

## 2021-03-09 NOTE — ASSESSMENT & PLAN NOTE
Normal exam.  Labs as ordered. Skin check normal.  No significant abnormal nevi. Skin check completed per Dr. Ivonne Mondragon significant abnormalities. Breast exam completed–no palpable abnormalities, discharge from the nipples or axillary adenopathy.   Mammo

## 2021-03-09 NOTE — PATIENT INSTRUCTIONS
Problem List Items Addressed This Visit        Unprioritized    Depressive disorder     History of depressive disorder, much better at this time is coping with stressors better. She has been on Lexapro 5 mg daily.   Continue on the same dose of medication, it today if willing.          Relevant Orders    CBC WITH DIFFERENTIAL WITH PLATELET    COMP METABOLIC PANEL (14)    LIPID PANEL    URINALYSIS, ROUTINE    TSH W REFLEX TO FREE T4    SVT (supraventricular tachycardia) (H[facility])     History of SVT, palpitations o

## 2021-03-09 NOTE — ASSESSMENT & PLAN NOTE
History of depressive disorder, much better at this time is coping with stressors better. She has been on Lexapro 5 mg daily. Continue on the same dose of medication, follow-up evaluation in the next few weeks. She is overdue for labs–orders requested.

## 2021-03-09 NOTE — ASSESSMENT & PLAN NOTE
Patient continues to smoke about a pack per day. She has been unable to tolerate the Chantix. Consider using the NicoDerm patches at this time instead of vaping devices due to additional complications associated.   Family history of lung cancer in her fat

## 2021-03-09 NOTE — ASSESSMENT & PLAN NOTE
History of SVT, palpitations off and on. She has been stable on acebutolol and monitored per Dr. Ben Redmond. No significant worsening at this time.

## 2021-03-09 NOTE — ASSESSMENT & PLAN NOTE
Pelvic exam completed–no cervical movement tenderness, adnexal palpable abnormalities. No uterine descent.   Pap smear completed

## 2021-03-09 NOTE — ASSESSMENT & PLAN NOTE
Patient has been on Lipitor 10 mg daily. She has tolerated it well.   Continue on the same dose of medication, advised to complete labs ASAP and follow-up

## 2021-03-12 LAB — HPV I/H RISK 1 DNA SPEC QL NAA+PROBE: NEGATIVE

## 2021-03-17 LAB
ABSOLUTE BASOPHILS: 65 CELLS/UL (ref 0–200)
ABSOLUTE EOSINOPHILS: 143 CELLS/UL (ref 15–500)
ABSOLUTE LYMPHOCYTES: 5161 CELLS/UL (ref 850–3900)
ABSOLUTE MONOCYTES: 533 CELLS/UL (ref 200–950)
ABSOLUTE NEUTROPHILS: 7098 CELLS/UL (ref 1500–7800)
ALBUMIN/GLOBULIN RATIO: 1.8 (CALC) (ref 1–2.5)
ALBUMIN: 4.4 G/DL (ref 3.6–5.1)
ALKALINE PHOSPHATASE: 75 U/L (ref 31–125)
ALT: 18 U/L (ref 6–29)
APPEARANCE: CLEAR
AST: 23 U/L (ref 10–35)
BASOPHILS: 0.5 %
BILIRUBIN, TOTAL: 0.6 MG/DL (ref 0.2–1.2)
BILIRUBIN: NEGATIVE
BUN: 14 MG/DL (ref 7–25)
CALCIUM: 10 MG/DL (ref 8.6–10.2)
CARBON DIOXIDE: 28 MMOL/L (ref 20–32)
CHLORIDE: 104 MMOL/L (ref 98–110)
CHOL/HDLC RATIO: 4.5 (CALC)
CHOLESTEROL, TOTAL: 185 MG/DL
CREATININE: 0.76 MG/DL (ref 0.5–1.1)
EGFR IF AFRICN AM: 108 ML/MIN/1.73M2
EGFR IF NONAFRICN AM: 93 ML/MIN/1.73M2
EOSINOPHILS: 1.1 %
GLOBULIN: 2.5 G/DL (CALC) (ref 1.9–3.7)
GLUCOSE: 98 MG/DL (ref 65–99)
GLUCOSE: NEGATIVE
HDL CHOLESTEROL: 41 MG/DL
HEMATOCRIT: 40.4 % (ref 35–45)
HEMOGLOBIN: 13.8 G/DL (ref 11.7–15.5)
KETONES: NEGATIVE
LDL-CHOLESTEROL: 104 MG/DL (CALC)
LEUKOCYTE ESTERASE: NEGATIVE
LYMPHOCYTES: 39.7 %
MCH: 29.5 PG (ref 27–33)
MCHC: 34.2 G/DL (ref 32–36)
MCV: 86.3 FL (ref 80–100)
MONOCYTES: 4.1 %
MPV: 9.6 FL (ref 7.5–12.5)
NEUTROPHILS: 54.6 %
NITRITE: NEGATIVE
NON-HDL CHOLESTEROL: 144 MG/DL (CALC)
OCCULT BLOOD: NEGATIVE
PH: 7.5 (ref 5–8)
PLATELET COUNT: 309 THOUSAND/UL (ref 140–400)
POTASSIUM: 4.5 MMOL/L (ref 3.5–5.3)
PROTEIN, TOTAL: 6.9 G/DL (ref 6.1–8.1)
PROTEIN: NEGATIVE
RDW: 12.8 % (ref 11–15)
RED BLOOD CELL COUNT: 4.68 MILLION/UL (ref 3.8–5.1)
SODIUM: 137 MMOL/L (ref 135–146)
SPECIFIC GRAVITY: 1.02 (ref 1–1.03)
TRIGLYCERIDES: 278 MG/DL
TSH W/REFLEX TO FT4: 1.37 MIU/L
VITAMIN B12: 371 PG/ML (ref 200–1100)
VITAMIN D, 25-OH, TOTAL: 22 NG/ML (ref 30–100)
WHITE BLOOD CELL COUNT: 13 THOUSAND/UL (ref 3.8–10.8)

## 2021-04-27 RX ORDER — ATORVASTATIN CALCIUM 10 MG/1
TABLET, FILM COATED ORAL
Qty: 90 TABLET | Refills: 1 | Status: SHIPPED | OUTPATIENT
Start: 2021-04-27

## 2021-06-29 ENCOUNTER — TELEPHONE (OUTPATIENT)
Dept: INTERNAL MEDICINE CLINIC | Facility: CLINIC | Age: 46
End: 2021-06-29

## 2021-06-29 DIAGNOSIS — Z12.31 ENCOUNTER FOR SCREENING MAMMOGRAM FOR MALIGNANT NEOPLASM OF BREAST: ICD-10-CM

## 2021-06-29 DIAGNOSIS — Z12.11 COLON CANCER SCREENING: Primary | ICD-10-CM

## 2021-06-29 NOTE — TELEPHONE ENCOUNTER
Dr Freeman Mendoza,   patient calling to request order for mammogram  Patient also reports family history of colon cancer and requests order for colonoscopy

## 2021-07-27 RX ORDER — ESCITALOPRAM OXALATE 5 MG/1
TABLET ORAL
Qty: 90 TABLET | Refills: 1 | Status: SHIPPED | OUTPATIENT
Start: 2021-07-27

## 2021-07-27 NOTE — TELEPHONE ENCOUNTER
Refill passed per [address] [place] protocol.   Requested Prescriptions   Pending Prescriptions Disp Refills    ESCITALOPRAM 5 MG Oral Tab [Pharmacy Med Name: ESCITALOPRAM 5MG TABLETS] 90 tablet 1     Sig: TAKE 1 TABLET(5 MG) BY MOUTH DAILY        Psychiatric

## 2021-09-21 ENCOUNTER — NURSE ONLY (OUTPATIENT)
Dept: GASTROENTEROLOGY | Facility: CLINIC | Age: 46
End: 2021-09-21

## 2021-09-21 RX ORDER — POLYETHYLENE GLYCOL 3350, SODIUM CHLORIDE, SODIUM BICARBONATE, POTASSIUM CHLORIDE 420; 11.2; 5.72; 1.48 G/4L; G/4L; G/4L; G/4L
POWDER, FOR SOLUTION ORAL
Qty: 4000 ML | Refills: 0 | Status: SHIPPED | OUTPATIENT
Start: 2021-09-21

## 2021-09-21 NOTE — PROGRESS NOTES
Terry Luis patient for her scheduled telephone colon screening.      Last seen by PCP on 3/9/2021 & referred to GI for her first colonoscopy     CBC from 3/16/2021 show no signs of anemia     Anticoagulants: no   Diabetic Meds: no   BP meds([facility] in

## 2021-09-21 NOTE — PROGRESS NOTES
Scheduling:  cln w/ [facility] w/ Dr. Getachew Rush  Dx: crc screening, fhx colon ca, fhx colon polyps  trilyte split dose sent e-scribe

## 2021-09-26 DIAGNOSIS — F32.A DEPRESSIVE DISORDER: ICD-10-CM

## 2021-09-27 RX ORDER — CLONAZEPAM 0.5 MG/1
TABLET ORAL
Qty: 30 TABLET | Refills: 0 | Status: SHIPPED | OUTPATIENT
Start: 2021-09-27 | End: 2021-11-30

## 2021-09-29 NOTE — PROGRESS NOTES
No response letter sent to patient via [employer] & printed/mailed to her home address. TE closed.

## 2021-10-27 RX ORDER — ERGOCALCIFEROL 1.25 MG/1
CAPSULE ORAL
Qty: 12 CAPSULE | Refills: 1 | Status: SHIPPED | OUTPATIENT
Start: 2021-10-27

## 2021-11-29 DIAGNOSIS — F32.A DEPRESSIVE DISORDER: ICD-10-CM

## 2021-11-30 ENCOUNTER — OFFICE VISIT (OUTPATIENT)
Dept: INTERNAL MEDICINE CLINIC | Facility: CLINIC | Age: 46
End: 2021-11-30
Payer: COMMERCIAL

## 2021-11-30 ENCOUNTER — HOSPITAL ENCOUNTER (OUTPATIENT)
Dept: GENERAL RADIOLOGY | Facility: HOSPITAL | Age: 46
Discharge: HOME | End: 2021-11-30
Attending: INTERNAL MEDICINE
Payer: COMMERCIAL

## 2021-11-30 ENCOUNTER — NURSE TRIAGE (OUTPATIENT)
Dept: INTERNAL MEDICINE CLINIC | Facility: CLINIC | Age: 46
End: 2021-11-30

## 2021-11-30 ENCOUNTER — LAB ENCOUNTER (OUTPATIENT)
Dept: LAB | Facility: HOSPITAL | Age: 46
End: 2021-11-30
Attending: INTERNAL MEDICINE
Payer: COMMERCIAL

## 2021-11-30 VITALS
SYSTOLIC BLOOD PRESSURE: 110 MMHG | HEART RATE: 82 BPM | TEMPERATURE: 98 F | WEIGHT: 159 LBS | RESPIRATION RATE: 20 BRPM | BODY MASS INDEX: 34.3 KG/M2 | HEIGHT: 57 IN | DIASTOLIC BLOOD PRESSURE: 76 MMHG

## 2021-11-30 DIAGNOSIS — R05.9 COUGH: ICD-10-CM

## 2021-11-30 DIAGNOSIS — R05.9 COUGH: Primary | ICD-10-CM

## 2021-11-30 DIAGNOSIS — L50.9 URTICARIA: ICD-10-CM

## 2021-11-30 PROCEDURE — 99214 OFFICE O/P EST MOD 30 MIN: CPT | Performed by: INTERNAL MEDICINE

## 2021-11-30 PROCEDURE — 3008F BODY MASS INDEX DOCD: CPT | Performed by: INTERNAL MEDICINE

## 2021-11-30 PROCEDURE — 71046 X-RAY EXAM CHEST 2 VIEWS: CPT | Performed by: INTERNAL MEDICINE

## 2021-11-30 PROCEDURE — 3074F SYST BP LT 130 MM HG: CPT | Performed by: INTERNAL MEDICINE

## 2021-11-30 PROCEDURE — 3078F DIAST BP <80 MM HG: CPT | Performed by: INTERNAL MEDICINE

## 2021-11-30 RX ORDER — AZITHROMYCIN 250 MG/1
TABLET, FILM COATED ORAL
Qty: 6 TABLET | Refills: 0 | Status: SHIPPED | OUTPATIENT
Start: 2021-11-30 | End: 2021-12-05

## 2021-11-30 RX ORDER — CLONAZEPAM 0.5 MG/1
TABLET ORAL
Qty: 30 TABLET | Refills: 0 | Status: SHIPPED | OUTPATIENT
Start: 2021-11-30 | End: 2022-02-01

## 2021-11-30 RX ORDER — HYDROXYZINE HYDROCHLORIDE 10 MG/1
TABLET, FILM COATED ORAL
Qty: 20 TABLET | Refills: 0 | Status: SHIPPED | OUTPATIENT
Start: 2021-11-30 | End: 2021-12-14

## 2021-11-30 RX ORDER — NITROFURANTOIN 25; 75 MG/1; MG/1
CAPSULE ORAL
COMMUNITY
Start: 2021-11-16 | End: 2021-11-30 | Stop reason: ALTCHOICE

## 2021-11-30 RX ORDER — PREDNISONE 1 MG/1
TABLET ORAL
Qty: 15 TABLET | Refills: 0 | Status: SHIPPED | OUTPATIENT
Start: 2021-11-30

## 2021-11-30 NOTE — PATIENT INSTRUCTIONS
Problem List Items Addressed This Visit        Unprioritized    Cough - Primary     Cough with fevers and chills for the past 5 days. Had a telemed visit and had a Covid test that was negative last Tuesday.   She has been on prednisone and a cough syrup wi

## 2021-11-30 NOTE — PROGRESS NOTES
HPI:    Patient ID: Kristina Grove is a 48year old female. c/o of hives and itchiness that started a couple days ago. Pt has taken benadryl which temporarily helps but then symptoms return. Pt is currently taking prednisone.  Pt is now experiencing swo Musculoskeletal: Negative. Skin: Positive for rash. Allergic/Immunologic: Negative. Neurological: Negative. Hematological: Negative. Psychiatric/Behavioral: Negative.              Current Outpatient Medications   Medication Sig Dispense Refi Appearance: Normal appearance. HENT:      Head: Normocephalic. Right Ear: Tympanic membrane normal.      Left Ear: Tympanic membrane normal.      Mouth/Throat:      Pharynx: No oropharyngeal exudate. Eyes:      General: No scleral icterus.      Ext had a Covid test that was negative last Tuesday. She has been on prednisone and a cough syrup without improvement. On examination she has bilateral wheezing. She is a smoker. She does have a history of chronic bronchitis. Chest x-ray is ordered.   Star

## 2021-11-30 NOTE — TELEPHONE ENCOUNTER
Action Requested: Summary for Provider     []  Critical Lab, Recommendations Needed  [] Need Additional Advice  []   FYI    []   Need Orders  [] Need Medications Sent to Pharmacy  []  Other     SUMMARY: c/o of hives and itchiness that started a couple days

## 2021-11-30 NOTE — ASSESSMENT & PLAN NOTE
Cough with fevers and chills for the past 5 days. Had a telemed visit and had a Covid test that was negative last Tuesday. She has been on prednisone and a cough syrup without improvement. On examination she has bilateral wheezing. She is a smoker.   Sh

## 2021-12-01 RX ORDER — ALPRAZOLAM 0.25 MG/1
0.25 TABLET ORAL DAILY PRN
Qty: 30 TABLET | Refills: 0 | OUTPATIENT
Start: 2021-12-01

## 2021-12-08 ENCOUNTER — HOSPITAL ENCOUNTER (OUTPATIENT)
Dept: MAMMOGRAPHY | Facility: HOSPITAL | Age: 46
Discharge: HOME | End: 2021-12-08
Attending: INTERNAL MEDICINE
Payer: COMMERCIAL

## 2021-12-08 DIAGNOSIS — Z12.31 ENCOUNTER FOR SCREENING MAMMOGRAM FOR MALIGNANT NEOPLASM OF BREAST: ICD-10-CM

## 2021-12-08 PROCEDURE — 77067 SCR MAMMO BI INCL CAD: CPT | Performed by: INTERNAL MEDICINE

## 2021-12-08 PROCEDURE — 77063 BREAST TOMOSYNTHESIS BI: CPT | Performed by: INTERNAL MEDICINE

## 2021-12-14 DIAGNOSIS — L50.9 URTICARIA: ICD-10-CM

## 2021-12-15 ENCOUNTER — TELEPHONE (OUTPATIENT)
Dept: INTERNAL MEDICINE CLINIC | Facility: CLINIC | Age: 46
End: 2021-12-15

## 2021-12-15 ENCOUNTER — HOSPITAL ENCOUNTER (OUTPATIENT)
Dept: MAMMOGRAPHY | Facility: HOSPITAL | Age: 46
Discharge: HOME | End: 2021-12-15
Attending: INTERNAL MEDICINE
Payer: COMMERCIAL

## 2021-12-15 ENCOUNTER — HOSPITAL ENCOUNTER (OUTPATIENT)
Dept: ULTRASOUND IMAGING | Facility: HOSPITAL | Age: 46
Discharge: HOME | End: 2021-12-15
Attending: INTERNAL MEDICINE
Payer: COMMERCIAL

## 2021-12-15 DIAGNOSIS — R92.8 ABNORMAL MAMMOGRAM: ICD-10-CM

## 2021-12-15 DIAGNOSIS — L50.9 URTICARIA: ICD-10-CM

## 2021-12-15 PROCEDURE — 77065 DX MAMMO INCL CAD UNI: CPT | Performed by: INTERNAL MEDICINE

## 2021-12-15 PROCEDURE — 77061 BREAST TOMOSYNTHESIS UNI: CPT | Performed by: INTERNAL MEDICINE

## 2021-12-15 PROCEDURE — 76642 ULTRASOUND BREAST LIMITED: CPT | Performed by: INTERNAL MEDICINE

## 2021-12-15 RX ORDER — ALPRAZOLAM 0.25 MG/1
0.25 TABLET ORAL DAILY PRN
Qty: 30 TABLET | Refills: 0 | Status: CANCELLED | OUTPATIENT
Start: 2021-12-15

## 2021-12-15 RX ORDER — ALPRAZOLAM 0.25 MG/1
0.25 TABLET ORAL DAILY PRN
Qty: 30 TABLET | Refills: 0 | Status: SHIPPED | OUTPATIENT
Start: 2021-12-15

## 2021-12-15 RX ORDER — PREDNISONE 5 MG/1
TABLET ORAL
Qty: 15 TABLET | Refills: 0 | Status: SHIPPED | OUTPATIENT
Start: 2021-12-15

## 2021-12-15 RX ORDER — HYDROXYZINE HYDROCHLORIDE 10 MG/1
TABLET, FILM COATED ORAL
Qty: 30 TABLET | Refills: 1 | Status: SHIPPED | OUTPATIENT
Start: 2021-12-15

## 2021-12-15 RX ORDER — HYDROXYZINE HYDROCHLORIDE 10 MG/1
TABLET, FILM COATED ORAL
Qty: 20 TABLET | Refills: 0 | Status: CANCELLED | OUTPATIENT
Start: 2021-12-15

## 2021-12-15 NOTE — TELEPHONE ENCOUNTER
Patient is contacting PCP via [employer] to inform that she has hives and dry cough. Please advise. Patient was trying to schedule an appt on line with PCP only but there was nothing available yesterday or today.

## 2021-12-15 NOTE — TELEPHONE ENCOUNTER
Call placed to patient. Patient identity confirmed by name and date of birth. Pt seen 11/30/21 for rash and cough. Symptoms persists. Has been using  HydrOxyzine and Benadryl to  address itch but both itching and the rash persist.Rash remains unchanged in appearance and is on neck, back and arms. Dry cough, strained voice x 2 weeks is improving but remains bothersome. Using mucinex with limited relief. Requesting Refills on pended medications. Please advise. Thank you.     Please reply to pool: EM RN Nelma Spurling

## 2021-12-16 NOTE — ASSESSMENT & PLAN NOTE
Rash on arms, chest, back, neck trunk and legs. No involvement of the palms and feet. Patient has been under a lot of stress. No significant lymphadenopathy cervical, axillary or inguinal.  Most likely a post viral rash. Will check for Covid.   Start on

## 2021-12-16 NOTE — TELEPHONE ENCOUNTER
Patient contacted. Advised to continue on the hydroxyzine twice daily. Continue on the Xanax and the clonazepam as directed. Prednisone as ordered but advised to complete the labs to check the blood count before starting on prednisone.   Referral to Dr. Michael Bangura provided

## 2022-01-20 ENCOUNTER — OFFICE VISIT (OUTPATIENT)
Dept: ALLERGY | Facility: CLINIC | Age: 47
End: 2022-01-20
Payer: COMMERCIAL

## 2022-01-20 ENCOUNTER — LAB ENCOUNTER (OUTPATIENT)
Dept: LAB | Age: 47
End: 2022-01-20
Attending: ALLERGY & IMMUNOLOGY
Payer: COMMERCIAL

## 2022-01-20 VITALS
OXYGEN SATURATION: 95 % | WEIGHT: 155 LBS | SYSTOLIC BLOOD PRESSURE: 108 MMHG | DIASTOLIC BLOOD PRESSURE: 72 MMHG | TEMPERATURE: 97 F | HEART RATE: 88 BPM | HEIGHT: 62.5 IN | BODY MASS INDEX: 27.81 KG/M2 | RESPIRATION RATE: 20 BRPM

## 2022-01-20 DIAGNOSIS — Z23 FLU VACCINE NEED: ICD-10-CM

## 2022-01-20 DIAGNOSIS — L50.1 CHRONIC IDIOPATHIC URTICARIA: Primary | ICD-10-CM

## 2022-01-20 DIAGNOSIS — Z91.018 FOOD ALLERGY: ICD-10-CM

## 2022-01-20 DIAGNOSIS — Z28.21 COVID-19 VACCINE DOSE DECLINED: ICD-10-CM

## 2022-01-20 DIAGNOSIS — Z91.09 ENVIRONMENTAL ALLERGIES: ICD-10-CM

## 2022-01-20 LAB
ALBUMIN SERPL-MCNC: 3.9 G/DL (ref 3.4–5)
ALBUMIN/GLOB SERPL: 1.2 {RATIO} (ref 1–2)
ALP LIVER SERPL-CCNC: 89 U/L
ALT SERPL-CCNC: 20 U/L
ANION GAP SERPL CALC-SCNC: 1 MMOL/L (ref 0–18)
AST SERPL-CCNC: 15 U/L (ref 15–37)
BILIRUB SERPL-MCNC: 0.3 MG/DL (ref 0.1–2)
BUN BLD-MCNC: 10 MG/DL (ref 7–18)
BUN/CREAT SERPL: 11.6 (ref 10–20)
CALCIUM BLD-MCNC: 9.8 MG/DL (ref 8.5–10.1)
CHLORIDE SERPL-SCNC: 107 MMOL/L (ref 98–112)
CO2 SERPL-SCNC: 30 MMOL/L (ref 21–32)
CREAT BLD-MCNC: 0.86 MG/DL
FASTING STATUS PATIENT QL REPORTED: NO
GLOBULIN PLAS-MCNC: 3.3 G/DL (ref 2.8–4.4)
GLUCOSE BLD-MCNC: 98 MG/DL (ref 70–99)
OSMOLALITY SERPL CALC.SUM OF ELEC: 285 MOSM/KG (ref 275–295)
POTASSIUM SERPL-SCNC: 4.5 MMOL/L (ref 3.5–5.1)
PROT SERPL-MCNC: 7.2 G/DL (ref 6.4–8.2)
SODIUM SERPL-SCNC: 138 MMOL/L (ref 136–145)
TSI SER-ACNC: 1.01 MIU/ML (ref 0.36–3.74)

## 2022-01-20 PROCEDURE — 36415 COLL VENOUS BLD VENIPUNCTURE: CPT | Performed by: INTERNAL MEDICINE

## 2022-01-20 PROCEDURE — 3074F SYST BP LT 130 MM HG: CPT | Performed by: ALLERGY & IMMUNOLOGY

## 2022-01-20 PROCEDURE — 3078F DIAST BP <80 MM HG: CPT | Performed by: ALLERGY & IMMUNOLOGY

## 2022-01-20 PROCEDURE — 3008F BODY MASS INDEX DOCD: CPT | Performed by: ALLERGY & IMMUNOLOGY

## 2022-01-20 PROCEDURE — 99244 OFF/OP CNSLTJ NEW/EST MOD 40: CPT | Performed by: ALLERGY & IMMUNOLOGY

## 2022-01-20 PROCEDURE — 85025 COMPLETE CBC W/AUTO DIFF WBC: CPT | Performed by: INTERNAL MEDICINE

## 2022-01-20 PROCEDURE — 84443 ASSAY THYROID STIM HORMONE: CPT | Performed by: INTERNAL MEDICINE

## 2022-01-20 PROCEDURE — 80053 COMPREHEN METABOLIC PANEL: CPT | Performed by: INTERNAL MEDICINE

## 2022-01-20 RX ORDER — LEVOCETIRIZINE DIHYDROCHLORIDE 5 MG/1
5 TABLET, FILM COATED ORAL EVERY 12 HOURS PRN
Qty: 60 TABLET | Refills: 0 | Status: SHIPPED | OUTPATIENT
Start: 2022-01-20

## 2022-01-20 NOTE — PATIENT INSTRUCTIONS
1. chronic idiopathic urticaria  8+ week history of hives. Hives started in late November 2021. Can be almost daily in nature. Rarely using Atarax which is causing sedation.   Handouts on chronic idiopathic urticaria provided and reviewed  Hives may pote

## 2022-01-20 NOTE — PROGRESS NOTES
Mani Hoffman is a 48year old female. HPI:   Patient presents with:  Hives: Consult. Patient reports that she first developed hives 11/2021 after experiencing a high fever.   Patient offers that hives have waxed and waned     Patient is a 18-year-old [facility]      Past Surgical History:   Procedure Laterality Date   •      • CRYOCAUTERY OF CERVIX     • ELECTROCARDIOGRAM, COMPLETE  2012    Scanned to media tab 2012   • ELECTROCARDIOGRAM, COMPLETE  2012    08- tablet 1   • CLONAZEPAM 0.5 MG Oral Tab TAKE 1 TABLET(0.5 MG) BY MOUTH DAILY AS NEEDED FOR ANXIETY 30 tablet 0   • escitalopram 5 MG Oral Tab TAKE 1 TABLET(5 MG) BY MOUTH DAILY 90 tablet 1   • atorvastatin 10 MG Oral Tab TAKE 1 TABLET(10 MG) BY MOUTH DAILY diarrhea and vomiting  Genitourinary:  Negative for dysuria and hematuria  Hema/Lymph:  Negative for easy bleeding and easy bruising  Integumentary:   See hpi   Musculoskeletal:  Negative for joint symptoms  Neurological:  Negative for dizziness, seizures possibility of chronic hives being idiopathic in nature with no specific trigger found  Check TSH level. See above skin testing to common environmental allergens  Trial of Xyzal, levocetirizine 5 mg once a day up to 4 times per day if needed.   Consider [facility]

## 2022-02-01 NOTE — TELEPHONE ENCOUNTER
Please review; protocol failed/no protocol    Requested Prescriptions   Pending Prescriptions Disp Refills    CLONAZEPAM 0.5 MG Oral Tab [Pharmacy Med Name: CLONAZEPAM 0.5MG TABLETS] 30 tablet 0     Sig: TAKE 1 TABLET(0.5 MG) BY MOUTH DAILY AS NEEDED FOR ANXIETY        There is no refill protocol information for this order            Recent Outpatient Visits              1 week ago Chronic idiopathic urticaria    [address], Serenity Conner MD    Office Visit    2 months ago Cough    [address], Jose Ortega MD    Office Visit    4 months ago     [facility] GI PROCEDURE    Nurse Only    10 months ago Encounter for annual routine gynecological examination    [facility], [facility], [address], Jose Ortega MD    Office Visit    12 months ago Skin tags, multiple acquired    [facility] [place], [place]    Office Visit

## 2022-02-03 RX ORDER — CLONAZEPAM 0.5 MG/1
0.5 TABLET ORAL
Qty: 30 TABLET | Refills: 5 | Status: SHIPPED | OUTPATIENT
Start: 2022-02-03

## 2022-03-30 RX ORDER — HYDROXYZINE HYDROCHLORIDE 10 MG/1
TABLET, FILM COATED ORAL
Qty: 30 TABLET | Refills: 1 | Status: SHIPPED | OUTPATIENT
Start: 2022-03-30

## 2022-04-20 RX ORDER — ATORVASTATIN CALCIUM 10 MG/1
TABLET, FILM COATED ORAL
Qty: 90 TABLET | Refills: 0 | OUTPATIENT
Start: 2022-04-20

## 2023-03-08 ENCOUNTER — TELEPHONE (OUTPATIENT)
Dept: OTHER | Facility: HOSPITAL | Age: 48
End: 2023-03-08

## 2023-03-08 NOTE — PROGRESS NOTES
Call from pt who LVM on RN heart line requesting to schedule a $49 dollar Heart Scan. Pt states she was transferred from central scheduling due to her answering yes to chest pain question. Call to pt to follow up. Identifiers completed. Pt states she has experienced chest pain in the past but is not currently experiencing any. She states she was instructed by her PCP to have a heart scan. Discussed her dx of SVT. Pt states she has been on a BB and hasn't had an episode of SVT in four years. Informed pt will have central scheduling give her a call to schedule. Pt verbalized understanding. All questions and concerns addressed.

## 2023-04-02 ENCOUNTER — HOSPITAL ENCOUNTER (OUTPATIENT)
Dept: CT IMAGING | Age: 48
Discharge: HOME | End: 2023-04-02
Attending: INTERNAL MEDICINE

## 2023-04-02 DIAGNOSIS — Z13.6 SCREENING FOR CARDIOVASCULAR CONDITION: ICD-10-CM

## 2024-02-07 ENCOUNTER — HOSPITAL ENCOUNTER (EMERGENCY)
Facility: HOSPITAL | Age: 49
Discharge: HOME | End: 2024-02-07
Attending: EMERGENCY MEDICINE
Payer: COMMERCIAL

## 2024-02-07 VITALS
OXYGEN SATURATION: 96 % | WEIGHT: 155 LBS | DIASTOLIC BLOOD PRESSURE: 80 MMHG | SYSTOLIC BLOOD PRESSURE: 116 MMHG | TEMPERATURE: 98 F | RESPIRATION RATE: 18 BRPM | BODY MASS INDEX: 28.52 KG/M2 | HEART RATE: 86 BPM | HEIGHT: 62 IN

## 2024-02-07 DIAGNOSIS — R79.89 ELEVATED LFTS: ICD-10-CM

## 2024-02-07 DIAGNOSIS — R19.7 DIARRHEA, UNSPECIFIED TYPE: Primary | ICD-10-CM

## 2024-02-07 LAB
ALBUMIN SERPL-MCNC: 4.8 G/DL (ref 3.2–4.8)
ALBUMIN/GLOB SERPL: 1.5 {RATIO} (ref 1–2)
ALP LIVER SERPL-CCNC: 82 U/L
ALT SERPL-CCNC: 54 U/L
ANION GAP SERPL CALC-SCNC: 4 MMOL/L (ref 0–18)
AST SERPL-CCNC: 95 U/L (ref ?–34)
BASOPHILS # BLD AUTO: 0.03 X10(3) UL (ref 0–0.2)
BASOPHILS NFR BLD AUTO: 0.4 %
BILIRUB SERPL-MCNC: 0.6 MG/DL (ref 0.3–1.2)
BUN BLD-MCNC: 12 MG/DL (ref 9–23)
BUN/CREAT SERPL: 13 (ref 10–20)
CALCIUM BLD-MCNC: 9.7 MG/DL (ref 8.7–10.4)
CHLORIDE SERPL-SCNC: 106 MMOL/L (ref 98–112)
CO2 SERPL-SCNC: 27 MMOL/L (ref 21–32)
CREAT BLD-MCNC: 0.92 MG/DL
DEPRECATED RDW RBC AUTO: 40.1 FL (ref 35.1–46.3)
EGFRCR SERPLBLD CKD-EPI 2021: 76 ML/MIN/1.73M2 (ref 60–?)
EOSINOPHIL # BLD AUTO: 0.2 X10(3) UL (ref 0–0.7)
EOSINOPHIL NFR BLD AUTO: 2.5 %
ERYTHROCYTE [DISTWIDTH] IN BLOOD BY AUTOMATED COUNT: 12.9 % (ref 11–15)
GLOBULIN PLAS-MCNC: 3.2 G/DL (ref 2.8–4.4)
GLUCOSE BLD-MCNC: 159 MG/DL (ref 70–99)
HCT VFR BLD AUTO: 44.8 %
HGB BLD-MCNC: 15.9 G/DL
IMM GRANULOCYTES # BLD AUTO: 0.02 X10(3) UL (ref 0–1)
IMM GRANULOCYTES NFR BLD: 0.3 %
LIPASE SERPL-CCNC: 44 U/L (ref 13–75)
LYMPHOCYTES # BLD AUTO: 2.98 X10(3) UL (ref 1–4)
LYMPHOCYTES NFR BLD AUTO: 38 %
MCH RBC QN AUTO: 30.2 PG (ref 26–34)
MCHC RBC AUTO-ENTMCNC: 35.5 G/DL (ref 31–37)
MCV RBC AUTO: 85 FL
MONOCYTES # BLD AUTO: 0.48 X10(3) UL (ref 0.1–1)
MONOCYTES NFR BLD AUTO: 6.1 %
NEUTROPHILS # BLD AUTO: 4.14 X10 (3) UL (ref 1.5–7.7)
NEUTROPHILS # BLD AUTO: 4.14 X10(3) UL (ref 1.5–7.7)
NEUTROPHILS NFR BLD AUTO: 52.7 %
OSMOLALITY SERPL CALC.SUM OF ELEC: 287 MOSM/KG (ref 275–295)
PLATELET # BLD AUTO: 231 10(3)UL (ref 150–450)
POTASSIUM SERPL-SCNC: 3.9 MMOL/L (ref 3.5–5.1)
PROT SERPL-MCNC: 8 G/DL (ref 5.7–8.2)
RBC # BLD AUTO: 5.27 X10(6)UL
SODIUM SERPL-SCNC: 137 MMOL/L (ref 136–145)
WBC # BLD AUTO: 7.9 X10(3) UL (ref 4–11)

## 2024-02-07 PROCEDURE — 80053 COMPREHEN METABOLIC PANEL: CPT | Performed by: EMERGENCY MEDICINE

## 2024-02-07 PROCEDURE — 96375 TX/PRO/DX INJ NEW DRUG ADDON: CPT

## 2024-02-07 PROCEDURE — 99284 EMERGENCY DEPT VISIT MOD MDM: CPT

## 2024-02-07 PROCEDURE — 83690 ASSAY OF LIPASE: CPT | Performed by: EMERGENCY MEDICINE

## 2024-02-07 PROCEDURE — 83690 ASSAY OF LIPASE: CPT

## 2024-02-07 PROCEDURE — 96361 HYDRATE IV INFUSION ADD-ON: CPT

## 2024-02-07 PROCEDURE — 85025 COMPLETE CBC W/AUTO DIFF WBC: CPT

## 2024-02-07 PROCEDURE — 85025 COMPLETE CBC W/AUTO DIFF WBC: CPT | Performed by: EMERGENCY MEDICINE

## 2024-02-07 PROCEDURE — 96374 THER/PROPH/DIAG INJ IV PUSH: CPT

## 2024-02-07 PROCEDURE — 80053 COMPREHEN METABOLIC PANEL: CPT

## 2024-02-07 RX ORDER — KETOROLAC TROMETHAMINE 30 MG/ML
30 INJECTION, SOLUTION INTRAMUSCULAR; INTRAVENOUS ONCE
Status: COMPLETED | OUTPATIENT
Start: 2024-02-07 | End: 2024-02-07

## 2024-02-07 RX ORDER — ONDANSETRON 2 MG/ML
4 INJECTION INTRAMUSCULAR; INTRAVENOUS ONCE
Status: COMPLETED | OUTPATIENT
Start: 2024-02-07 | End: 2024-02-07

## 2024-02-07 RX ORDER — LOPERAMIDE HYDROCHLORIDE 2 MG/1
2 TABLET ORAL AS NEEDED
Qty: 20 TABLET | Refills: 0 | Status: SHIPPED | OUTPATIENT
Start: 2024-02-07 | End: 2024-03-08

## 2024-02-07 NOTE — ED PROVIDER NOTES
Patient Seen in: [facility] Emergency Department    History     Chief Complaint   Patient presents with    Abdomen/Flank Pain       HPI    History is provided by patient/independent historian: patient  48-year-old female with history of anxiety, hyperlipidemia, cholelithiasis, here with complaints of tan-colored diarrhea for the past 3 days.  She did have a temp of 101 3 days ago.  No blood.  No vomiting.  She is nauseous and has decreased appetite.  No recent travel or antibiotic usage.  No history of C. difficile.    History reviewed.   Past Medical History:   Diagnosis Date    Anxiety     Anxiety state, unspecified     Cervical dysplasia     CRYO    H/O abnormal cervical Papanicolaou smear     Abnormal pap; colposcopy done mid     H/O pregnancy 2012    ; pregnancy management -Cervidil    Hyperlipidemia     Lipid screening 2011    per NG    Panic attacks     PSVT (paroxysmal supraventricular tachycardia)     Hospital D/C         History reviewed.   Past Surgical History:   Procedure Laterality Date          CRYOCAUTERY OF CERVIX      ELECTROCARDIOGRAM, COMPLETE  2012    Scanned to media tab 2012    ELECTROCARDIOGRAM, COMPLETE  2012    OTHER SURGICAL HISTORY      Colposcopy done mid          Home Medications reviewed :  (Not in a hospital admission)        History reviewed.   Social History     Socioeconomic History    Marital status: Single   Tobacco Use    Smoking status: Every Day     Packs/day: 1.00     Years: 18.00     Additional pack years: 0.00     Total pack years: 18.00     Types: Cigarettes    Smokeless tobacco: Never    Tobacco comments:     cut down to 1/2ppd in 2018   Vaping Use    Vaping Use: Never used   Substance and Sexual Activity    Alcohol use: Yes     Alcohol/week: 0.0 standard drinks of alcohol     Comment: 1 drink every 2-3 months    Drug use: No    Sexual activity: Yes     Partners: Male     Birth  control/protection: Condom   Other Topics Concern    Caffeine Concern Yes     Comment: chocolate, 1-2 pieces daily    History of tanning Yes    Pt has a pacemaker No    Pt has a defibrillator No    Reaction to local anesthetic No         ROS  Review of Systems   Constitutional:  Positive for appetite change.   Respiratory:  Negative for shortness of breath.    Cardiovascular:  Negative for chest pain.   Gastrointestinal:  Positive for abdominal pain, diarrhea and nausea.   All other systems reviewed and are negative.     All other pertinent organ systems are reviewed and are negative.      Physical Exam     ED Triage Vitals [02/07/24 0954]   /80   Pulse 96   Resp 18   Temp 98 °F (36.7 °C)   Temp src Oral   SpO2 96 %   O2 Device None (Room air)     Vital signs reviewed.      Physical Exam  Vitals and nursing note reviewed.   HENT:      Mouth/Throat:      Comments: Dry mucous membranes  Cardiovascular:      Pulses: Normal pulses.   Pulmonary:      Effort: No respiratory distress.   Abdominal:      General: There is no distension.      Tenderness: There is no abdominal tenderness.   Neurological:      Mental Status: She is alert.         ED Course       Labs:     Labs Reviewed   COMP METABOLIC PANEL (14) - Abnormal; Notable for the following components:       Result Value    Glucose 159 (*)     ALT 54 (*)     AST 95 (*)     All other components within normal limits   LIPASE - Normal   CBC WITH DIFFERENTIAL WITH PLATELET    Narrative:     The following orders were created for panel order CBC With Differential With Platelet.                  Procedure                               Abnormality         Status                                     ---------                               -----------         ------                                     CBC W/ DIFFERENTIAL[[id number]]                              Final result                                                 Please view results for these tests on the individual  orders.   CBC W/ DIFFERENTIAL         My EKG Interpretation:   As reviewed and Interpreted by me      Imaging Results Available and Reviewed while in ED:   No results found.      Decision rules/scores evaluated: none      Diagnostic labs/tests considered but not ordered: CT abd/pelvis    ED Medications Administered:   Medications   sodium chloride 0.9 % IV bolus 1,000 mL (1,000 mL Intravenous New Bag 2/7/24 1135)   ondansetron (Zofran) 4 MG/2ML injection 4 mg (4 mg Intravenous Given 2/7/24 1136)   ketorolac (Toradol) 30 MG/ML injection 30 mg (30 mg Intravenous Given 2/7/24 1137)            - pt feeling improved    MDM       Medical Decision Making      Differential Diagnosis: After obtaining the patient's history, performing the physical exam and reviewing the diagnostics, multiple initial diagnoses were considered based on the presenting problem including gastroenteritis, viral syndrome, YUE, parasite infection    External document review: I personally reviewed available external medical records for any recent pertinent discharge summaries, testing, and procedures - the findings are as follows: visit with Dr. Hurtado - with US that showed fatty infiltration, gallstones    Complicating Factors: The patient already  has a past medical history of Anxiety, Anxiety state, unspecified, Cervical dysplasia, H/O abnormal cervical Papanicolaou smear, H/O pregnancy (2012), Hyperlipidemia, Lipid screening (1/12/2011), Panic attacks, and PSVT (paroxysmal supraventricular tachycardia). to contribute to the complexity of this ED evaluation.    Procedures performed: none    Discussed management with physician/appropriate source: none    Considered admission/deescalation of care for: diarrhea    Social determinants of health affecting patient care: none    Prescription medications considered: imodium, discussed continuing current medication regimen    The patient requires continuous monitoring for: diarrhea    Shared decision making:  discussed possible admission        Disposition and Plan     Clinical Impression:  1. Diarrhea, unspecified type    2. Elevated LFTs        Disposition:  Discharge    Follow-up:  Holley Glez MD   [address] [address]  [phone]    Follow up        Medications Prescribed:  Current Discharge Medication List        START taking these medications    Details   Loperamide HCl 2 MG Oral Tab Take 1 tablet (2 mg total) by mouth as needed for Diarrhea.  Qty: 20 tablet, Refills: 0

## 2024-02-07 NOTE — ED INITIAL ASSESSMENT (HPI)
Pt ambulatory through triage c/o diarrhea for last 3 days and fatigue abd cramps. Fever of 101 on Sunday resolved. Pt states stool is tan colored. Denies blood in stool. Hx of gallstones. Pt able to still eat and drink. Pt took misoprostol last night for biopsy today.

## 2024-07-05 NOTE — PROGRESS NOTES
HPI:    Patient ID: Tan Rodriguez is a 37year old female. Physical       G1,P1 C SEC. LAST PAP 2011. ALL (PAPS NORMAL.   SMOKER-1PPD  X 22 YRS,  lmp 2 weeks back,cycles are regular          Past Medical History:   Diagnosis Date   • Anxiety    • Anxiety Wife called back stating he would need to come in today cannot do Monday! Asking if someone can call her ASAP once in the office.    Phone: [phone]   500 MG Oral Tab Take 1 tablet (500 mg total) by mouth daily for 10 days. Disp: 10 tablet Rfl: 0   fluticasone-salmeterol (ADVAIR DISKUS) 100-50 MCG/DOSE Inhalation Aerosol Powder, Breath Activated Inhale 1 puff into the lungs 2 (two) times daily.  Disp: 3 e intact distal pulses. No murmur heard. Pulmonary/Chest: Effort normal and breath sounds normal. She has no wheezes. She has no rales. She exhibits no mass and no tenderness.  Right breast exhibits no inverted nipple, no mass, no nipple discharge, no skin lymphadenopathy. Hernial orifices intact. Pap Smear,3 Years due on 08/17/2020  Pelvic exam deferred. Rectal exam deferred.     Immunization History   Administered Date(s) Administered   • Influenza 10/01/2014, 10/01/2015, 10/05/2016, 10/02/2018   • TDAP Disp Refills   • atorvastatin 10 MG Oral Tab 90 tablet 1     Sig: TAKE 1 TABLET(10 MG) BY MOUTH DAILY   • escitalopram 5 MG Oral Tab 90 tablet 1     Sig: TAKE 1 TABLET(5 MG) BY MOUTH DAILY   • levofloxacin (LEVAQUIN) 500 MG Oral Tab 10 tablet 0     Sig: Ta

## (undated) DIAGNOSIS — F32.A DEPRESSIVE DISORDER: ICD-10-CM

## (undated) DIAGNOSIS — L50.9 URTICARIA: ICD-10-CM

## (undated) NOTE — LETTER
8/28/2017              [address]        [facility]         Dear Alin Woodard,    It was a pleasure to see you. Your PAP test was normal.  There is no need for further testing at this time.   I look forward to seeing you at you